# Patient Record
Sex: MALE | Race: WHITE | NOT HISPANIC OR LATINO | URBAN - METROPOLITAN AREA
[De-identification: names, ages, dates, MRNs, and addresses within clinical notes are randomized per-mention and may not be internally consistent; named-entity substitution may affect disease eponyms.]

---

## 2020-11-13 ENCOUNTER — EMERGENCY (EMERGENCY)
Facility: HOSPITAL | Age: 60
LOS: 0 days | Discharge: HOME | End: 2020-11-13
Attending: EMERGENCY MEDICINE | Admitting: EMERGENCY MEDICINE
Payer: COMMERCIAL

## 2020-11-13 VITALS
DIASTOLIC BLOOD PRESSURE: 76 MMHG | TEMPERATURE: 98 F | OXYGEN SATURATION: 96 % | RESPIRATION RATE: 18 BRPM | HEART RATE: 76 BPM | SYSTOLIC BLOOD PRESSURE: 101 MMHG | WEIGHT: 175.05 LBS

## 2020-11-13 VITALS
RESPIRATION RATE: 16 BRPM | OXYGEN SATURATION: 97 % | SYSTOLIC BLOOD PRESSURE: 105 MMHG | DIASTOLIC BLOOD PRESSURE: 60 MMHG | HEART RATE: 70 BPM

## 2020-11-13 DIAGNOSIS — R06.02 SHORTNESS OF BREATH: ICD-10-CM

## 2020-11-13 DIAGNOSIS — U07.1 COVID-19: ICD-10-CM

## 2020-11-13 LAB
ALBUMIN SERPL ELPH-MCNC: 3.6 G/DL — SIGNIFICANT CHANGE UP (ref 3.5–5.2)
ALP SERPL-CCNC: 72 U/L — SIGNIFICANT CHANGE UP (ref 30–115)
ALT FLD-CCNC: 26 U/L — SIGNIFICANT CHANGE UP (ref 0–41)
ANION GAP SERPL CALC-SCNC: 10 MMOL/L — SIGNIFICANT CHANGE UP (ref 7–14)
AST SERPL-CCNC: 28 U/L — SIGNIFICANT CHANGE UP (ref 0–41)
BASOPHILS # BLD AUTO: 0.01 K/UL — SIGNIFICANT CHANGE UP (ref 0–0.2)
BASOPHILS NFR BLD AUTO: 0.4 % — SIGNIFICANT CHANGE UP (ref 0–1)
BILIRUB SERPL-MCNC: 0.3 MG/DL — SIGNIFICANT CHANGE UP (ref 0.2–1.2)
BUN SERPL-MCNC: 11 MG/DL — SIGNIFICANT CHANGE UP (ref 10–20)
CALCIUM SERPL-MCNC: 8.1 MG/DL — LOW (ref 8.5–10.1)
CHLORIDE SERPL-SCNC: 103 MMOL/L — SIGNIFICANT CHANGE UP (ref 98–110)
CO2 SERPL-SCNC: 25 MMOL/L — SIGNIFICANT CHANGE UP (ref 17–32)
CREAT SERPL-MCNC: 0.9 MG/DL — SIGNIFICANT CHANGE UP (ref 0.7–1.5)
EOSINOPHIL # BLD AUTO: 0.02 K/UL — SIGNIFICANT CHANGE UP (ref 0–0.7)
EOSINOPHIL NFR BLD AUTO: 0.7 % — SIGNIFICANT CHANGE UP (ref 0–8)
GLUCOSE SERPL-MCNC: 114 MG/DL — HIGH (ref 70–99)
HCT VFR BLD CALC: 43.5 % — SIGNIFICANT CHANGE UP (ref 42–52)
HGB BLD-MCNC: 14.4 G/DL — SIGNIFICANT CHANGE UP (ref 14–18)
IMM GRANULOCYTES NFR BLD AUTO: 0.4 % — HIGH (ref 0.1–0.3)
LACTATE SERPL-SCNC: 1.3 MMOL/L — SIGNIFICANT CHANGE UP (ref 0.7–2)
LYMPHOCYTES # BLD AUTO: 0.86 K/UL — LOW (ref 1.2–3.4)
LYMPHOCYTES # BLD AUTO: 30.9 % — SIGNIFICANT CHANGE UP (ref 20.5–51.1)
MAGNESIUM SERPL-MCNC: 2.1 MG/DL — SIGNIFICANT CHANGE UP (ref 1.8–2.4)
MCHC RBC-ENTMCNC: 30.8 PG — SIGNIFICANT CHANGE UP (ref 27–31)
MCHC RBC-ENTMCNC: 33.1 G/DL — SIGNIFICANT CHANGE UP (ref 32–37)
MCV RBC AUTO: 92.9 FL — SIGNIFICANT CHANGE UP (ref 80–94)
MONOCYTES # BLD AUTO: 0.34 K/UL — SIGNIFICANT CHANGE UP (ref 0.1–0.6)
MONOCYTES NFR BLD AUTO: 12.2 % — HIGH (ref 1.7–9.3)
NEUTROPHILS # BLD AUTO: 1.54 K/UL — SIGNIFICANT CHANGE UP (ref 1.4–6.5)
NEUTROPHILS NFR BLD AUTO: 55.4 % — SIGNIFICANT CHANGE UP (ref 42.2–75.2)
NRBC # BLD: 0 /100 WBCS — SIGNIFICANT CHANGE UP (ref 0–0)
PLATELET # BLD AUTO: 147 K/UL — SIGNIFICANT CHANGE UP (ref 130–400)
POTASSIUM SERPL-MCNC: 4.1 MMOL/L — SIGNIFICANT CHANGE UP (ref 3.5–5)
POTASSIUM SERPL-SCNC: 4.1 MMOL/L — SIGNIFICANT CHANGE UP (ref 3.5–5)
PROT SERPL-MCNC: 5.9 G/DL — LOW (ref 6–8)
RBC # BLD: 4.68 M/UL — LOW (ref 4.7–6.1)
RBC # FLD: 11.9 % — SIGNIFICANT CHANGE UP (ref 11.5–14.5)
SODIUM SERPL-SCNC: 138 MMOL/L — SIGNIFICANT CHANGE UP (ref 135–146)
WBC # BLD: 2.78 K/UL — LOW (ref 4.8–10.8)
WBC # FLD AUTO: 2.78 K/UL — LOW (ref 4.8–10.8)

## 2020-11-13 PROCEDURE — 71045 X-RAY EXAM CHEST 1 VIEW: CPT | Mod: 26

## 2020-11-13 PROCEDURE — 99285 EMERGENCY DEPT VISIT HI MDM: CPT

## 2020-11-13 RX ORDER — SODIUM CHLORIDE 9 MG/ML
2000 INJECTION, SOLUTION INTRAVENOUS ONCE
Refills: 0 | Status: COMPLETED | OUTPATIENT
Start: 2020-11-13 | End: 2020-11-13

## 2020-11-13 RX ADMIN — SODIUM CHLORIDE 2000 MILLILITER(S): 9 INJECTION, SOLUTION INTRAVENOUS at 14:09

## 2020-11-13 NOTE — ED PROVIDER NOTE - PHYSICAL EXAMINATION
CONST: Well appearing in NAD  EYES: PERRL, EOMI, Sclera and conjunctiva clear.   ENT: No nasal discharge. Oropharynx normal appearing, no erythema or exudates. Uvula midline.  NECK: Non-tender  CARD: Normal S1 S2; Normal rate and rhythm  RESP: Equal BS B/L, No wheezes, rhonchi or rales. No distress  GI: Soft, non-tender, non-distended.  SKIN: Warm, dry, no acute rashes. Good turgor  NEURO: A&Ox3, No focal deficits. Strength 5/5 with no sensory deficits. Steady gait

## 2020-11-13 NOTE — ED ADULT TRIAGE NOTE - CHIEF COMPLAINT QUOTE
"I have shortness of breath and I'm tired." Pt advises he was diagnosed positive COVID on Tuesday. He has been taking Z-pack and Tylenol for symptoms (which include fever). He has a home Pulse Ox which registered between 86 and 90 last night/this morning. Pt's pulse ox upon arrival to ED 96% room air.

## 2020-11-13 NOTE — ED PROVIDER NOTE - OBJECTIVE STATEMENT
61 yo M no known PMH presenting to the ED c/o increasing SOB since yesterday. Pt reports he tested positive for COVID at his PMD office on Monday, pt reports he has been experiencing fever, chills, SOB, body aches and fatigue for 7 days. Pt was prescribed Azithromycin PO 5 day course by PMD on Monday, completed course today. Pt reports he has still been experiencing fevers daily, 100.8 this am, has been taking Tylenol every 6 hours. Pt reports he has an at home pulse ox, this morning reading was 89. Pt denies any aggravating or alleviating factors for the SOB, has been constant. Denies cough, chest pain, leg swelling, sore throat, loss of smell, loss of taste, headache, dizziness.

## 2020-11-13 NOTE — ED PROVIDER NOTE - PROGRESS NOTE DETAILS
PA fellow note reviewed and agree, Xray normal, POx 96% in ED, patient comfortable, No respiratory difficulty  Stable for DC

## 2020-11-13 NOTE — ED PROVIDER NOTE - NSFOLLOWUPINSTRUCTIONS_ED_ALL_ED_FT
You have tested POSITIVE for the novel coronavirus (COVID-19). Upon discharge, you must self-quarantine for 14 days, or until the Department of Health contacts you. Please wear a face mask if you are around other individuals. Try to avoid contact with house members, family, and friends for the duration of this quarantine. Please follow up with your primary care physician within 2-3 weeks of your discharge from the hospital. Please take all medications as prescribed. If you experience any worsening or recurrence of your symptoms, particularly worsening or high fever, shortness of breathe, extreme fatigue, or bloody cough please call 9-1-1 immediately or report to the nearest Emergency Department.    Follow up with your Primary Care Doctor next week.

## 2020-11-13 NOTE — ED PROVIDER NOTE - PATIENT PORTAL LINK FT
You can access the FollowMyHealth Patient Portal offered by [facility] by registering at the following website: [email or web address]. By joining [facility]’s FollowMyHealth portal, you will also be able to view your health information using other applications (apps) compatible with our system.

## 2020-11-13 NOTE — ED PROVIDER NOTE - ATTENDING CONTRIBUTION TO CARE
60 M to ED with covid19 sx and low pulseox at home.  h/o SOB, chills and bodyaches and day 7 of illness.  last pm pulseox noted to be low so to ED for eval, pt feels well otherwise.  Exam as noted.

## 2020-11-13 NOTE — ED ADULT NURSE NOTE - NSIMPLEMENTINTERV_GEN_ALL_ED
Implemented All Universal Safety Interventions:  [place] to call system. Call bell, personal items and telephone within reach. Instruct patient to call for assistance. Room bathroom lighting operational. Non-slip footwear when patient is off stretcher. Physically safe environment: no spills, clutter or unnecessary equipment. Stretcher in lowest position, wheels locked, appropriate side rails in place.

## 2020-11-13 NOTE — ED PROVIDER NOTE - NS ED ROS FT
Constitutional: (+) fever, chills, fatigue, body-aches  Eyes:  No visual changes  ENMT:  No neck pain  Cardiac:  No chest pain  Respiratory: (+)SOB.  No cough  GI:  No nausea, vomiting, diarrhea, abdominal pain.  :  No dysuria, hematuria  MS:  No back pain, no leg swelling.  Neuro:  No headache or lightheadedness  Skin:  No skin rash  Endocrine: No history of thyroid disease or diabetes.  Except as documented in the HPI,  all other systems are negative.
